# Patient Record
Sex: FEMALE | Race: WHITE
[De-identification: names, ages, dates, MRNs, and addresses within clinical notes are randomized per-mention and may not be internally consistent; named-entity substitution may affect disease eponyms.]

---

## 2019-06-13 NOTE — EDM.PDOC
<Rayne Naylor - Last Filed: 06/13/19 06:47>





ED HPI GENERAL MEDICAL PROBLEM





- General


Chief Complaint: Abdominal Pain


Stated Complaint: ABDOMINAL PAIN


Time Seen by Provider: 06/13/19 04:38





- History of Present Illness


INITIAL COMMENTS - FREE TEXT/NARRATIVE: 





HISTORY AND PHYSICAL:





History of present illness:


The patient is a 23-year-old female with no GI or gynecologic history and no 

abdominal surgical procedures who presents with gradual onset of left lower 

quadrant pain that started at 3 AM, a proximally 2 hours ago. She says the pain 

is like a fullness that is not sharp and she has not taken anything for the 

pain. She's had no nausea or vomiting no fevers chills no flank pain or urinary 

complaints. The patient did have sexual intercourse earlier this evening but 

there was no pain with that and the pain started multiple hours after that. She 

has no history of vaginal bleeding and denies pregnancy as she is on constant 

oral contraception continuously throughout the month. She does say that 

sometimes she will forget to take a dose. The patient did not take any 

medications for this pain prior to coming here and describes it more as an 

uncomfortable feeling and she says she keeps feeling the urge that she has to 

have a bowel movement but nothing is coming out. She leaves that she had a 

bowel movement yesterday but she does not recall and she normally does have 

bowel movements every day. She does drink fluids and does not drink much 

caffeine but she eats mostly cereal and does not eat a lot of vegetables and 

fruits. He does not feel bloated or gassy.





Review of systems: 


As per history of present illness and below otherwise all systems reviewed and 

negative.





Past medical history: 


As per history of present illness and as reviewed below otherwise 

noncontributory.





Surgical history: 


As per history of present illness and as reviewed below otherwise 

noncontributory.





Social history: 


No reported history of drug or alcohol abuse.





Family history: 


As per history of present illness and as reviewed below otherwise 

noncontributory.





Physical exam:


General: Well-developed well-nourished female who is nontoxic and moves very 

easily in the ED without distress. Vital signs are noted by me


HEENT: Atraumatic, normocephalic, , negative for conjunctival pallor or scleral 

icterus, mucous membranes moist, throat clear, neck supple, nontender, trachea 

midline.


Lungs: Clear to auscultation, breath sounds equal bilaterally, chest nontender.


Heart: S1S2, regular rate and rhythm no overt murmurs


Abdomen: Soft, nondistended, there is some tympany on the right lower abdominal 

area without pain or guarding and there is some very minimal tenderness in the 

left lower quadrant on deep palpation which the patient describes as more of a 

discomfort than as a pain. There is no rebound or guarding appreciated and 

overall the exam is very unimpressive. When his disability palpated in the deep 

left pelvis the patient has no specific tenderness. Negative for masses or 

hepatosplenomegaly. Negative for costovertebral tenderness.


Pelvis: Stable nontender.


Genitourinary: Deferred.


Rectal: Deferred.


Extremities: Atraumatic, negative for cords or calf pain. Neurovascular 

unremarkable.


Neuro: Awake, alert, oriented. Cranial nerves II through XII unremarkable. 

Cerebellum unremarkable. Motor and sensory unremarkable throughout. Exam 

nonfocal.





Diagnostics:


UA with reflex HCG CBC CMP lactic acid abdominal x-rays


CT scan of the abdomen and pelvis


Therapeutics:


IV placement, patient declines pain medication at this time


Patient had one episode of vomiting here and says that she doesn't feel like 

she can make a urine sample so I will give Zofran and a liter of IV fluids





We are currently awaiting the x-ray results and the patient still has not given 

us a urine sample and now he is requesting pain meds. I will give her a dose of 

Toradol





The x-rays are back and it does not reveal significant constipation although 

there is some gas and stool. The patient is now saying that this pressure 

feeling that she is having actually resembles a prior UTI where she felt the 

urge to poop and pee and then could not do so. She says she keeps the bathroom 

and she will only have small drops of urine. She says she doesn't feel like she 

can get a urine sample so I have offered her straight catheter which we will go 

ahead and do as she feels this is important to test the urine. I discussed with 

her CAT scan but she would like to wait to see the tests of the urine prior to 

doing that.





I discussed with patient and mother bedside at all testing results do not 

indicate a reason for the pain and patient seems somewhat distressed by this. 

Her overall clinical picture does not demonstrate acute pain but she says that 

the pain is deep and aching and she would like to explore all options for the 

cause. I've offered her CT scan of the abdomen and pelvis with the caveat that 

this may also not identified a source for her pain. She would like to go ahead 

and proceed to do this and to explore every option. I will order a CT with 

contrast





0650: Case  is endorsed to Dr Lowery to follow up the CT result and disposition 

the patient with that result.





Impression: 


Left lower quadrant pain





Definitive disposition and diagnosis as appropriate pending reevaluation and 

review of above.


  ** LLQ abdomen


Pain Score (Numeric/FACES): 5





- Related Data


 Allergies











Allergy/AdvReac Type Severity Reaction Status Date / Time


 


No Known Allergies Allergy   Verified 06/13/19 04:37











Home Meds: 


 Home Meds





Diclofenac Sodium [Voltaren] 75 mg PO BIDMEALS PRN #20 tab.cr 06/13/19 [Rx]


Escitalopram [Lexapro] 1 tab PO DAILY 06/13/19 [History]


LORazepam 1 tab PO ASDIRECTED PRN 06/13/19 [History]











Past Medical History





- Past Health History


Medical/Surgical History: Denies Medical/Surgical History


Psychiatric History: Reports: Anxiety





- Past Surgical History


Musculoskeletal Surgical History: Reports: Other (See Below)


Other Musculoskeletal Surgeries/Procedures:: finger sx





Social & Family History





- Family History


Family Medical History: Noncontributory





- Tobacco Use


Smoking Status *Q: Never Smoker





- Recreational Drug Use


Recreational Drug Use: No





ED ROS GENERAL





- Review of Systems


Review Of Systems: ROS reveals no pertinent complaints other than HPI.





ED EXAM, GENERAL





- Physical Exam


Exam: See Below (See dictation)





Course





- Vital Signs


Last Recorded V/S: 


 Last Vital Signs











Temp  97.7 F   06/13/19 04:30


 


Pulse  68   06/13/19 07:20


 


Resp  16   06/13/19 07:20


 


BP  123/59 L  06/13/19 07:20


 


Pulse Ox  100   06/13/19 07:20














- Orders/Labs/Meds


Orders: 


 Active Orders 24 hr











 Category Date Time Status


 


 Sodium Chloride 0.9% [Saline Flush] Med  06/13/19 04:50 Active





 10 ml FLUSH ASDIRECTED PRN   


 


 Sodium Chloride 0.9% [Saline Flush] Med  06/13/19 04:50 Active





 2.5 ml FLUSH ASDIRECTED PRN   


 


 Saline Lock Insert [OM.PC] Stat Oth  06/13/19 04:50 Ordered








 Medication Orders





Sodium Chloride (Saline Flush)  10 ml FLUSH ASDIRECTED PRN


   PRN Reason: Keep Vein Open


Sodium Chloride (Saline Flush)  2.5 ml FLUSH ASDIRECTED PRN


   PRN Reason: Keep Vein Open








Labs: 


 Laboratory Tests











  06/13/19 06/13/19 06/13/19 Range/Units





  05:00 05:00 05:00 


 


WBC  9.84    (4.0-11.0)  K/uL


 


RBC  4.78    (4.30-5.90)  M/uL


 


Hgb  13.7    (12.0-16.0)  g/dL


 


Hct  40.2    (36.0-46.0)  %


 


MCV  84.1    (80.0-98.0)  fL


 


MCH  28.7    (27.0-32.0)  pg


 


MCHC  34.1    (31.0-37.0)  g/dL


 


RDW Std Deviation  39.4    (28.0-62.0)  fl


 


RDW Coeff of Harjeet  13    (11.0-15.0)  %


 


Plt Count  255    (150-400)  K/uL


 


MPV  10.20    (7.40-12.00)  fL


 


Neut % (Auto)  43.7 L    (48.0-80.0)  %


 


Lymph % (Auto)  46.3 H    (16.0-40.0)  %


 


Mono % (Auto)  7.8    (0.0-15.0)  %


 


Eos % (Auto)  2.0    (0.0-7.0)  %


 


Baso % (Auto)  0.2    (0.0-1.5)  %


 


Neut # (Auto)  4.3    (1.4-5.7)  K/uL


 


Lymph # (Auto)  4.6 H    (0.6-2.4)  K/uL


 


Mono # (Auto)  0.8    (0.0-0.8)  K/uL


 


Eos # (Auto)  0.2    (0.0-0.7)  K/uL


 


Baso # (Auto)  0.0    (0.0-0.1)  K/uL


 


Nucleated RBC %  0.0    /100WBC


 


Nucleated RBCs #  0    K/uL


 


Lactate   1.1   (0.20-2.00)  mmol/L


 


Sodium    136  (136-145)  mmol/L


 


Potassium    3.4 L  (3.5-5.1)  mmol/L


 


Chloride    103  ()  mmol/L


 


Carbon Dioxide    26.1  (21.0-32.0)  mmol/L


 


BUN    14  (7.0-18.0)  mg/dL


 


Creatinine    0.9  (0.6-1.0)  mg/dL


 


Est Cr Clr Drug Dosing    76.89  mL/min


 


Estimated GFR (MDRD)    > 60.0  ml/min


 


Glucose    115 H  ()  mg/dL


 


Calcium    9.1  (8.5-10.1)  mg/dL


 


Total Bilirubin    0.4  (0.2-1.0)  mg/dL


 


AST    21  (15-37)  IU/L


 


ALT    22  (14-63)  IU/L


 


Alkaline Phosphatase    51  ()  U/L


 


Total Protein    7.3  (6.4-8.2)  g/dL


 


Albumin    3.8  (3.4-5.0)  g/dL


 


Globulin    3.5  (2.6-4.0)  g/dL


 


Albumin/Globulin Ratio    1.1  (0.9-1.6)  


 


HCG, Qual     (NEG)  


 


Urine Color     


 


Urine Appearance     


 


Urine pH     (5.0-8.0)  


 


Ur Specific Gravity     (1.001-1.035)  


 


Urine Protein     (NEGATIVE)  mg/dL


 


Urine Glucose (UA)     (NEGATIVE)  mg/dL


 


Urine Ketones     (NEGATIVE)  mg/dL


 


Urine Occult Blood     (NEGATIVE)  


 


Urine Nitrite     (NEGATIVE)  


 


Urine Bilirubin     (NEGATIVE)  


 


Urine Urobilinogen     (<2.0)  EU/dL


 


Ur Leukocyte Esterase     (NEGATIVE)  


 


Urine RBC     (0-2/HPF)  


 


Urine WBC     (0-5/HPF)  


 


Ur Epithelial Cells     (NONE-FEW)  


 


Urine Bacteria     (NEGATIVE)  


 


Urine Mucus     (NONE-MOD)  


 


Urinalysis Comment     














  06/13/19 06/13/19 Range/Units





  05:00 06:25 


 


WBC    (4.0-11.0)  K/uL


 


RBC    (4.30-5.90)  M/uL


 


Hgb    (12.0-16.0)  g/dL


 


Hct    (36.0-46.0)  %


 


MCV    (80.0-98.0)  fL


 


MCH    (27.0-32.0)  pg


 


MCHC    (31.0-37.0)  g/dL


 


RDW Std Deviation    (28.0-62.0)  fl


 


RDW Coeff of Harjeet    (11.0-15.0)  %


 


Plt Count    (150-400)  K/uL


 


MPV    (7.40-12.00)  fL


 


Neut % (Auto)    (48.0-80.0)  %


 


Lymph % (Auto)    (16.0-40.0)  %


 


Mono % (Auto)    (0.0-15.0)  %


 


Eos % (Auto)    (0.0-7.0)  %


 


Baso % (Auto)    (0.0-1.5)  %


 


Neut # (Auto)    (1.4-5.7)  K/uL


 


Lymph # (Auto)    (0.6-2.4)  K/uL


 


Mono # (Auto)    (0.0-0.8)  K/uL


 


Eos # (Auto)    (0.0-0.7)  K/uL


 


Baso # (Auto)    (0.0-0.1)  K/uL


 


Nucleated RBC %    /100WBC


 


Nucleated RBCs #    K/uL


 


Lactate    (0.20-2.00)  mmol/L


 


Sodium    (136-145)  mmol/L


 


Potassium    (3.5-5.1)  mmol/L


 


Chloride    ()  mmol/L


 


Carbon Dioxide    (21.0-32.0)  mmol/L


 


BUN    (7.0-18.0)  mg/dL


 


Creatinine    (0.6-1.0)  mg/dL


 


Est Cr Clr Drug Dosing    mL/min


 


Estimated GFR (MDRD)    ml/min


 


Glucose    ()  mg/dL


 


Calcium    (8.5-10.1)  mg/dL


 


Total Bilirubin    (0.2-1.0)  mg/dL


 


AST    (15-37)  IU/L


 


ALT    (14-63)  IU/L


 


Alkaline Phosphatase    ()  U/L


 


Total Protein    (6.4-8.2)  g/dL


 


Albumin    (3.4-5.0)  g/dL


 


Globulin    (2.6-4.0)  g/dL


 


Albumin/Globulin Ratio    (0.9-1.6)  


 


HCG, Qual  NEGATIVE   (NEG)  


 


Urine Color   YELLOW  


 


Urine Appearance   CLEAR  


 


Urine pH   5.5  (5.0-8.0)  


 


Ur Specific Gravity   >= 1.030  (1.001-1.035)  


 


Urine Protein   NEGATIVE  (NEGATIVE)  mg/dL


 


Urine Glucose (UA)   NEGATIVE  (NEGATIVE)  mg/dL


 


Urine Ketones   NEGATIVE  (NEGATIVE)  mg/dL


 


Urine Occult Blood   MODERATE H  (NEGATIVE)  


 


Urine Nitrite   NEGATIVE  (NEGATIVE)  


 


Urine Bilirubin   NEGATIVE  (NEGATIVE)  


 


Urine Urobilinogen   0.2  (<2.0)  EU/dL


 


Ur Leukocyte Esterase   NEGATIVE  (NEGATIVE)  


 


Urine RBC   4-6  (0-2/HPF)  


 


Urine WBC   0-2  (0-5/HPF)  


 


Ur Epithelial Cells   MODERATE  (NONE-FEW)  


 


Urine Bacteria   FEW  (NEGATIVE)  


 


Urine Mucus   MODERATE  (NONE-MOD)  


 


Urinalysis Comment     











Meds: 


Medications











Generic Name Dose Route Start Last Admin





  Trade Name Freq  PRN Reason Stop Dose Admin


 


Sodium Chloride  10 ml  06/13/19 04:50  





  Saline Flush  FLUSH   





  ASDIRECTED PRN   





  Keep Vein Open   





     





     





     


 


Sodium Chloride  2.5 ml  06/13/19 04:50  





  Saline Flush  FLUSH   





  ASDIRECTED PRN   





  Keep Vein Open   





     





     





     














Discontinued Medications














Generic Name Dose Route Start Last Admin





  Trade Name Freq  PRN Reason Stop Dose Admin


 


Sodium Chloride  1,000 mls @ 999 mls/hr  06/13/19 05:24  06/13/19 05:29





  Normal Saline  IV  06/13/19 06:24  999 mls/hr





  STAT ONE   Administration





     





     





     





     


 


Sodium Chloride  1,000 mls @ 999 mls/hr  06/13/19 06:31  06/13/19 06:36





  Normal Saline  IV  06/13/19 07:31  999 mls/hr





  STAT ONE   Administration





     





     





     





     


 


Iopamidol  100 ml  06/13/19 07:10  06/13/19 07:10





  Isovue Multipack-370 (76%)  IVPUSH  06/13/19 07:11  100 ml





  ONETIME STA   Administration





     





     





     





     


 


Ketorolac Tromethamine  30 mg  06/13/19 06:04  06/13/19 06:10





  Toradol  IVPUSH  06/13/19 06:05  30 mg





  ONETIME ONE   Administration





     





     





     





     


 


Ondansetron HCl  4 mg  06/13/19 05:24  06/13/19 05:30





  Zofran  IVPUSH  06/13/19 05:25  4 mg





  ONETIME ONE   Administration





     





     





     





     














Departure





- Departure


Disposition: Home, Self-Care 01


Condition: Good


Clinical Impression: 


Abdominal pain


Qualifiers:


 Abdominal location: left lower quadrant Qualified Code(s): R10.32 - Left lower 

quadrant pain








- Discharge Information


Prescriptions: 


Diclofenac Sodium [Voltaren] 75 mg PO BIDMEALS PRN #20 tab.cr


 PRN Reason: Pain


Referrals: 


Briana Coreas DO [Primary Care Provider] - 


Forms:  ED Department Discharge


Additional Instructions: 


The following information is given to patients seen in the emergency department 

who are being discharged to home. This information is to outline your options 

for follow-up care. We provide all patients seen in our emergency department 

with a follow-up referral.





The need for follow-up, as well as the timing and circumstances, are variable 

depending upon the specifics of your emergency department visit.





If you don't have a primary care physician on staff, we will provide you with a 

referral. We always advise you to contact your personal physician following an 

emergency department visit to inform them of the circumstance of the visit and 

for follow-up with them and/or the need for any referrals to a consulting 

specialist.





The emergency department will also refer you to a specialist when appropriate. 

This referral assures that you have the opportunity for followup care with a 

specialist. All of these measure are taken in an effort to provide you with 

optimal care, which includes your followup.





Under all circumstances we always encourage you to contact your private 

physician who remains a resource for coordinating  your care. When calling for 

followup care, please make the office aware that this follow-up is from your 

recent emergency room visit. If for any reason you are refused follow-up, 

please contact the [facility] emergency 

department at [phone] and ask to speak to the emergency department 

charge nurse.





[facility] 


Primary care- Internal Medicine and Family Prctice


[address] [place] [address]


[phone]








[facility]


Specialty Care - Urology


[address] [place] [id number]


Phone: [phone]


Fax: [phone]





<Shannan Lowery - Last Filed: 06/13/19 08:11>





ED HPI GENERAL MEDICAL PROBLEM





- History of Present Illness


INITIAL COMMENTS - FREE TEXT/NARRATIVE: 


Patient was signed out to me by Dr. Naylor to check results of CT scan. 

Patient has left flank and lower quadrant pain with few red blood cells in her 

urine. Patient CT showed a 4 mm stone at the left UVJ is causing mild 

hydronephrosis. Remainder of the exam is normal. She is being discharged with 

instructions to drink plenty of fluids











Departure





- Departure


Time of Disposition: 08:11





- Discharge Information


*PRESCRIPTION DRUG MONITORING PROGRAM REVIEWED*: No


*COPY OF PRESCRIPTION DRUG MONITORING REPORT IN PATIENT ROHAN: No

## 2019-06-13 NOTE — CR
Indication:



Left lower quadrant abdomen pain.



Technique:



Abdomen 3 view.



Comparison:



None. 



Findings:



Bowel: Bowel pattern is normal. There is little to no colonic stool.



Other: No sign of free air.  No sign of soft tissue mass.  No suspicious 

calcifications. Osseous structures are unremarkable for age.  



Impression:



Unremarkable abdomen. No sign of constipation.



Dictated by Booker Rincon MD @ Jun 13 2019  6:15AM



Signed by Dr. Booker Rincon @ Jun 13 2019  6:15AM

## 2019-06-13 NOTE — CT
INDICATION:



Left lower quadrant abdomen pain.



TECHNIQUE:



CT abdomen and pelvis acquired with 100 cc Isovue 370 IV contrast.



COMPARISON:



None. 



FINDINGS:



Lower chest: Unremarkable. 



Liver: Unremarkable. Normal in size and attenuation. No masses. 



Gallbladder and bile ducts: Unremarkable. No stones or inflammation. No 

biliary dilatation. 



Pancreas: Unremarkable. No mass or inflammation. 



Spleen: Unremarkable. Normal in size. No masses. 



Adrenal glands: Unremarkable. No nodules. 



Kidneys: A 4 mm stone at the left ureterovesical junction is causing mild 

hydronephrosis and perinephric edema. No other stones. 



GI tract: Unremarkable. Normal in caliber. No sign of mass or inflammation. 

Normal appendix.



Vasculature: Unremarkable. Mesenteric arteries are patent.



Lymph nodes: No lymphadenopathy. 



Omentum/Peritoneum/Abdominal Wall: Unremarkable. No sign of mass or 

infiltration. No free air or significant free fluid. 



Pelvis: Unremarkable. 



Bones: Unremarkable for age. 



IMPRESSION:



4 mm stone at the left UVJ is causing mild hydronephrosis. Remainder of the 

exam is normal.



Please note that all CT scans at this facility use dose modulation, 

iterative reconstruction, and/or weight-based dosing when appropriate to 

reduce radiation dose to as low as reasonably achievable.



Dictated by Booker Rincon MD @ Jun 13 2019  7:58AM



Signed by Dr. Booker Rincon @ Jun 13 2019  8:02AM

## 2021-01-03 ENCOUNTER — HOSPITAL ENCOUNTER (OUTPATIENT)
Dept: HOSPITAL 56 - MW.OBCHECK | Age: 26
Setting detail: OBSERVATION
LOS: 3 days | Discharge: HOME | End: 2021-01-06
Attending: OBSTETRICS & GYNECOLOGY | Admitting: OBSTETRICS & GYNECOLOGY
Payer: COMMERCIAL

## 2021-01-03 DIAGNOSIS — F41.9: ICD-10-CM

## 2021-01-03 DIAGNOSIS — Z91.040: ICD-10-CM

## 2021-01-03 DIAGNOSIS — Z3A.40: ICD-10-CM

## 2021-01-03 PROCEDURE — 82803 BLOOD GASES ANY COMBINATION: CPT

## 2021-01-03 PROCEDURE — 36415 COLL VENOUS BLD VENIPUNCTURE: CPT

## 2021-01-03 PROCEDURE — 85027 COMPLETE CBC AUTOMATED: CPT

## 2021-01-03 PROCEDURE — 85014 HEMATOCRIT: CPT

## 2021-01-03 PROCEDURE — 59025 FETAL NON-STRESS TEST: CPT

## 2021-01-03 PROCEDURE — 51702 INSERT TEMP BLADDER CATH: CPT

## 2021-01-03 PROCEDURE — 59409 OBSTETRICAL CARE: CPT

## 2021-01-03 PROCEDURE — 88307 TISSUE EXAM BY PATHOLOGIST: CPT

## 2021-01-03 PROCEDURE — 85018 HEMOGLOBIN: CPT

## 2021-01-03 PROCEDURE — 86850 RBC ANTIBODY SCREEN: CPT

## 2021-01-03 PROCEDURE — 86901 BLOOD TYPING SEROLOGIC RH(D): CPT

## 2021-01-03 PROCEDURE — 86592 SYPHILIS TEST NON-TREP QUAL: CPT

## 2021-01-03 PROCEDURE — 86900 BLOOD TYPING SEROLOGIC ABO: CPT

## 2021-01-04 RX ADMIN — WITCH HAZEL PRN CONTAINER: 500 SOLUTION RECTAL; TOPICAL at 22:37

## 2021-01-04 NOTE — OR
SURGEON:

Elma Yoon M.D.

 

DATE OF PROCEDURE:  2021

 

PREOPERATIVE DIAGNOSES:

1. 40 and 3-week term pregnancy.

2. Active labor.

3. Meconium-stained amniotic fluid.

 

POSTOPERATIVE DIAGNOSES:

1. 40 and 3-week term pregnancy.

2. Active labor.

3. Meconium-stained amniotic fluid.

 

PROCEDURE:

Spontaneous vaginal delivery, second-degree midline laceration repaired.

 

PRIMARY SURGEON:

Elma Yoon M.D.

 

ANESTHESIA:

Epidural.

 

ESTIMATED BLOOD LOSS:

300 mL.

 

 

COMPLICATIONS:

None known.

 

FINDINGS:

Viable male, Apgar scores 2 at one minute, 6 at fives minutes, 9 at ten minutes.

Spontaneous delivery, intact placenta, 3-vessel cord.  Meconium-stained amniotic

fluid and placenta noted.

 

DISPOSITION:

Infant to  nursery.  Mom in LDRP.

 

PROCEDURE DETAILS:

Pat is a 25-year-old G2, P 0-0-1-0 at 40 and 3 weeks' gestational age, who

presented with a protracted early labor pattern, and after observation was

making some slow cervical change, therefore, admitted to Labor and Delivery.

The patient was monitored and made slow cervical change.  Therefore, amniotomy

was performed.  Meconium-stained amniotic fluid was noted.  This was shortly

before 7 a.m.  The patient was increasingly uncomfortable, however, contractions

were still fairly mild.  Therefore, initiated on Pitocin augmentation.  The

patient underwent epidural and became more comfortable.  Shortly before 10 a.m.,

she was still found to be 4 to 5 cm, 90% effaced, +1 station.  Fetal heart tones

were 120s variability.  The patient continued to progress and during the

afternoon progressed a bit more quickly.  Shortly before 2 p.m., she was found

to be 8 cm.  Within the next a 2-1/2 hours, she progressed to complete, 100%

effaced, +1 station.  Began pushing efforts shortly after 4 p.m.  She pushed for

the next hour with minimal change in station.  Therefore, I evaluated and found

that the fetus was felt to be ROP presentation.  Therefore, after palpating

sagittal sutures was able to rotate to an OA position; and with continued

pushing efforts, the patient began to progress much more nicely thereafter.

Within the next hour, she was able to progress and push to a +4 station.  The

fetal heart tones at this point were now 130s to 140s with variability.

Occasional deceleration with pushing efforts, however, with fairly rapid return

to baseline.  The patient was able to deliver the head atraumatically

spontaneously, followed by anterior shoulder, posterior shoulder, and remainder

of the body.  The infant's oropharynx and nares were bulb suctioned.  The infant

was handed off to his mother with attending nursing staff at her side.  The

infant was not making attempts to cry vigorously at this juncture.  Therefore,

given the meconium staining, the cord was clamped x2 and cut.  The infant was

transferred to the warming isolette.

 

Cord arterial, cord venous, and cord blood sampling were obtained.  Light

pressure was applied while the placenta was delivered spontaneously intact.

Vigorous fundal uterine massage was then applied while 30 units of Pitocin was

delivered in 500 mL of fluid.  Upon inspection of cervix, vaginal sidewall, and

perineum, there was found to be a deeper second-degree midline laceration,

repaired using 3-0 Vicryl in the usual fashion.  Uterus remained firm.  Upon

inspection of cervix, vaginal sidewalls, and perineum, hemostasis was evident.

Sponge, instrument, and needle count was correct.  The patient will remain in

LDRP.  The infant is being transferred to  nursery for observation.

 

 

[place] / BETZY

DD:  2021 19:10:01

DT:  2021 20:26:19

Job #:  [id number]

MTDD

## 2021-01-04 NOTE — PCM.OPNOTE
- General Post-Op/Procedure Note


Date of Surgery/Procedure: 21


Operative Procedure(s): /2nd MLL repaired


Findings: 





Viable male APGARs 2, 6, 9 weight 3210 gm. Spontaneous delivery intact placenta 

with 3V cord. Meconium stained fluid


Pre Op Diagnosis: 40/3 week IUP.  Labor.  Meconium stained fluid


Post-Op Diagnosis: Same


Anesthesia Technique: Epidural


Primary Surgeon: Elma Yoon


EBL in mLs: 300


Complications: none known


Condition: Good


Free Text/Narrative:: 


                                 Intake & Output











 21





 06:59 14:59 22:59


 


Intake Total  3000 900


 


Balance  3000 900








Dictation [id number]

## 2021-01-04 NOTE — PCM.SN.2
- Free Text/Narrative


Note: 





Increased rate to 10ml/hr. Bolus total 4ml 0.2 % Naropin with 100 mcg Fentanyl. 

Demand bolus off.  Complete.

## 2021-01-04 NOTE — PCM.PRNOTE
- Free Text/Narrative


Note: 





Anes Note





Patient requests epidural for L&D. Sitting position, Level L3-L4 midline 

approach. Sterile techniuqe. Chloraprep scrub to lumbar area. Steril fenestrated

drape applied.





Epidural space easily achieved single attempt with ease using LUCILLE technique. LUCILLE

at 3 cm. Cath threaded 5 cm with ease. Cath secured at skin using steril clear 

adhesive dressing. 





Test 0645 3 cc 1.5% lido with epi negative. 





Load 0648 10 cc 0.2% ropivicaine with 1 mcg cc fentanyl in slow divided doses. 





0655 Pump started wtih 90 cc same solution. Rate is 8 cc hr with 6 cc q 20 min 

prn bolus. 





Yana well.





Time with patient [id number]

## 2021-01-04 NOTE — PCM.SN.2
- Free Text/Narrative


Note: 





Bolus not functioning.  Difficult to flush.  Pulled out 1/8 inch, flushed well. 

Bolus given 4ml 0.2% naropin + 100 mcg fentanyl.  Pain resolved.  Pump 

functioning well.  1215





!6:05 Bolus not functioning.  Bolused manually with ml 0.2% Naropin.  Pump 

functioning well.

## 2021-01-04 NOTE — PCM.PREANE
Preanesthetic Assessment





- Anesthesia/Transfusion/Family Hx


Anesthesia History: Prior Anesthesia Without Reaction


Family History of Anesthesia Reaction: No


Transfusion History: No Prior Transfusion(s)





- Physical Assessment


NPO Status Date: 01/04/21


NPO Status Time: 00:05


Height: 1.6 m


Weight: 81.647 kg


ASA Class: 2





- Lab


Values: 





                             Laboratory Last Values











WBC  20.46 K/uL (4.0-11.0)  H  01/03/21  23:59    


 


RBC  4.56 M/uL (4.30-5.90)   01/03/21  23:59    


 


Hgb  13.5 g/dL (12.0-16.0)   01/03/21  23:59    


 


Hct  39.0 % (36.0-46.0)   01/03/21  23:59    


 


MCV  85.5 fL (80.0-98.0)   01/03/21  23:59    


 


MCH  29.6 pg (27.0-32.0)   01/03/21  23:59    


 


MCHC  34.6 g/dL (31.0-37.0)   01/03/21  23:59    


 


RDW Std Deviation  42.3 fl (28.0-62.0)   01/03/21  23:59    


 


RDW Coeff of Harjeet  14 % (11.0-15.0)   01/03/21  23:59    


 


Plt Count  204 K/uL (150-400)   01/03/21  23:59    


 


MPV  11.10 fL (7.40-12.00)   01/03/21  23:59    


 


Blood Type  O POSITIVE   01/03/21  23:59    


 


Antibody Screen  NEGATIVE   01/03/21  23:59    














- Allergies


Allergies/Adverse Reactions: 


                                    Allergies











Allergy/AdvReac Type Severity Reaction Status Date / Time


 


Latex, Natural Rubber Allergy  Rash Verified 01/03/21 17:00














- Acknowledgements


Anesthesia Type Planned: Epidural


Pt an Appropriate Candidate for the Planned Anesthesia: Yes


Alternatives and Risks of Anesthesia Discussed w Pt/Guardian: Yes


Pt/Guardian Understands and Agrees with Anesthesia Plan: Yes





PreAnesthesia Questionnaire





- Past Health History


Medical/Surgical History: Denies Medical/Surgical History


HEENT History: Reports: Impaired Vision


Cardiovascular History: Reports: None


Respiratory History: Reports: None


Gastrointestinal History: Reports: None


Genitourinary History: Reports: Renal Calculus


OB/GYN History: Reports: Pregnancy


Neurological History: Reports: None


Psychiatric History: Reports: Anxiety, Panic Attack


Endocrine/Metabolic History: Reports: None


Hematologic History: Reports: None


Immunologic History: Reports: None


Oncologic (Cancer) History: Reports: None


Dermatologic History: Reports: None





- Infectious Disease History


Infectious Disease History: Reports: None





- Past Surgical History


GI Surgical History: Reports: None


Female  Surgical History: Reports: None


Musculoskeletal Surgical History: Reports: Other (See Below)


Other Musculoskeletal Surgeries/Procedures:: left 4th finger surgery





- SUBSTANCE USE


Tobacco Use Status *Q: Never Tobacco User


Second Hand Smoke Exposure: No


Recreational Drug Use History: No





- HOME MEDS


Home Medications: 


                                    Home Meds





Prenatal Vits #93/Iron Fum/FA [Prenatal Formula Tablet] 1 each PO BEDTIME 

12/31/20 [History]


Escitalopram [Lexapro] 15 mg PO BEDTIME 01/03/21 [History]


hydrOXYzine HCL [hydrOXYzine] 1 tab PO Q4H PRN 01/03/21 [History]











- CURRENT (IN HOUSE) MEDS


Current Meds: 





                               Current Medications





Butorphanol Tartrate (Stadol)  1 mg IVPUSH Q1H PRN


   PRN Reason: Pain


Carboprost Tromethamine (Hemabate Ds)  250 mcg IM ASDIRECTED PRN


   PRN Reason: Post Partum Hemorrhage


Oxytocin/Sodium Chloride (Oxytocin 30 Unit/500 Ml-Ns)  30 unit in 500 mls @ 500 

mls/hr IV TITRATE [facility]


Tranexamic Acid 1,000 mg/ (Sodium Chloride)  110 mls @ 660 mls/hr IV ONETIME PRN


   PRN Reason: Bleeding


Lactated Ringer's (Ringers, Lactated)  1,000 mls @ 150 mls/hr IV ASDIRECTED [facility]


Lidocaine HCl (Xylocaine 1%)  50 ml INJECT ONETIME PRN


   PRN Reason: Laceration repair


Methylergonovine Maleate (Methergine)  0.2 mg IM ASDIRECTED PRN


   PRN Reason: Post Partum Hemorrhage


Misoprostol (Cytotec)  200 mcg PO ONETIME PRN


   PRN Reason: Post Partum Hemorrhage


Nalbuphine HCl (Nubain)  10 mg IVPUSH Q1H PRN


   PRN Reason: Pain (severe 7-10)


Sodium Chloride (Saline Flush)  10 ml FLUSH ASDIRECTED PRN


   PRN Reason: Keep Vein Open


Sodium Chloride (Saline Flush)  2.5 ml FLUSH ASDIRECTED PRN


   PRN Reason: Keep Vein Open


Sodium Chloride (Normal Saline)  10 ml IV ASDIRECTED PRN


   PRN Reason: IV Use


Sterile Water (Sterile Water For Irrigation)  1,000 ml IRR ASDIRECTED PRN


   PRN Reason: delivery





Discontinued Medications





Fentanyl (Sublimaze) Confirm Administered Dose 100 mcg .ROUTE [facility]


   Stop: 01/04/21 06:33


Ropivacaine (Naropin 0.2%) Confirm Administered Dose 100 mls @ as directed 

.ROUTE [facility]


   Stop: 01/04/21 06:33

## 2021-01-05 RX ADMIN — WITCH HAZEL PRN CONTAINER: 500 SOLUTION RECTAL; TOPICAL at 21:53

## 2021-01-05 NOTE — PCM.PNPP
- General Info


Date of Service: 21


Functional Status: Reports: Pain Controlled, Tolerating Diet, Ambulating, 

Urinating





- Review of Systems


General: Reports: Fatigue.  Denies: Fever, Weakness


Pulmonary: Denies: Shortness of Breath


Cardiovascular: Denies: Chest Pain, Palpitations, Lightheadedness


Gastrointestinal: Denies: Abdominal Pain, Nausea, Vomiting


Genitourinary: Denies: Flank Pain


Skin: Reports: No Symptoms


Neurological: Reports: No Symptoms


Psychiatric: Reports: No Symptoms





- General Info


Date of Service: 21





- Patient Data


Vital Signs - Most Recent: 


                                Last Vital Signs











Temp  36.5 C   21 03:30


 


Pulse  90   21 03:30


 


Resp  16   21 03:30


 


BP  101/56 L  21 03:30


 


Pulse Ox  97   21 03:30











Weight - Most Recent: 81.647 kg


I&O - Last 24 Hours: 


                                 Intake & Output











 21





 22:59 06:59 14:59


 


Intake Total 900  


 


Balance 900  











Lab Results - Last 24 Hours: 


                         Laboratory Results - last 24 hr











  21 Range/Units





  18:35 06:15 


 


Hgb   11.3 L  (12.0-16.0)  g/dL


 


Hct   34.3 L  (36.0-46.0)  %


 


Cord ABG pH  7.017 L   (7.18-7.38)  


 


Cord ABG Base Excess  -13 L   (-10--2)  


 


Cord VBG pH  7.109 L   (7.25-7.45)  


 


Cord VBG Base Excess  -13 L   (-10--2)  











Med Orders - Current: 


                               Current Medications





Acetaminophen (Tylenol Extra Strength)  500 mg PO Q4H PRN


   PRN Reason: Pain


Acetaminophen (Tylenol Extra Strength)  1,000 mg PO Q4H PRN


   PRN Reason: Pain


   Last Admin: 21 00:56 Dose:  1,000 mg


   Documented by: 


Benzocaine/Menthol (Dermoplast Pain Relief 20%-0.5% Spray)  78 gm TOP ASDIRECTED

PRN


   PRN Reason: Perineal Comfort Measure


   Last Admin: 21 22:37 Dose:  1 container


   Documented by: 


Bisacodyl (Dulcolax)  10 mg RECTAL ONETIME PRN


   PRN Reason: Constipation


Carboprost Tromethamine (Hemabate Ds)  250 mcg IM ASDIRECTED PRN


   PRN Reason: Post Partum Hemorrhage


Docusate Sodium (Colace)  100 mg PO BID PRN


   PRN Reason: Constipation


   Last Admin: 21 22:35 Dose:  100 mg


   Documented by: 


Emollient Ointment (Lansinoh Hpa)  0 gm TOP ASDIRECTED PRN


   PRN Reason: Sore Nipples


Escitalopram Oxalate (Lexapro)  10 mg PO DAILY [facility]


Hydrocortisone (Proctozone-Hc 2.5% Crm)  1 gm TOP CONTINUOUS PRN


   PRN Reason: Itching


Oxytocin/Sodium Chloride (Oxytocin 30 Unit/500 Ml-Ns)  30 unit in 500 mls @ 500 

mls/hr IV TITRATE [facility]


Tranexamic Acid 1,000 mg/ (Sodium Chloride)  110 mls @ 660 mls/hr IV ONETIME PRN


   PRN Reason: Bleeding


Lactated Ringer's (Ringers, Lactated)  1,000 mls @ 150 mls/hr IV ASDIRECTED [facility]


   Last Admin: 21 14:45 Dose:  250 mls/hr


   Documented by: 


Oxytocin/Sodium Chloride (Oxytocin 30 Unit/500 Ml-Ns)  30 unit in 500 mls @ 2 

mls/hr IV TITRATE [facility]; Protocol


   Last Titration: 21 19:08 Dose:  250 munits/min, 250 mls/hr


   Documented by: 


Ibuprofen (Motrin)  400 mg PO Q4H PRN


   PRN Reason: Pain


Ibuprofen (Motrin)  800 mg PO Q6H PRN


   PRN Reason: Pain


   Last Admin: 21 04:12 Dose:  800 mg


   Documented by: 


Lidocaine HCl (Xylocaine 1%)  50 ml INJECT ONETIME PRN


   PRN Reason: Laceration repair


Methylergonovine Maleate (Methergine)  0.2 mg IM ASDIRECTED PRN


   PRN Reason: Post Partum Hemorrhage


Oxycodone HCl (Oxycodone)  5 mg PO Q2H PRN


   PRN Reason: Pain


Sodium Chloride (Saline Flush)  10 ml FLUSH ASDIRECTED PRN


   PRN Reason: Keep Vein Open


Sodium Chloride (Saline Flush)  2.5 ml FLUSH ASDIRECTED PRN


   PRN Reason: Keep Vein Open


Sodium Chloride (Normal Saline)  10 ml IV ASDIRECTED PRN


   PRN Reason: IV Use


Sterile Water (Sterile Water For Irrigation)  1,000 ml IRR ASDIRECTED PRN


   PRN Reason: delivery


Witch Hazel (Tucks)  1 pad TOP ASDIRECTED PRN


   PRN Reason: comfort care


   Last Admin: 21 22:37 Dose:  1 container


   Documented by: 





Discontinued Medications





Butorphanol Tartrate (Stadol)  1 mg IVPUSH Q1H PRN


   PRN Reason: Pain


Fentanyl (Sublimaze) Confirm Administered Dose 100 mcg .ROUTE .[facility]-MED ONE


   Stop: 21 06:33


Fentanyl (Sublimaze) Confirm Administered Dose 100 mcg .ROUTE .[facility]-MED ONE


   Stop: 21 11:32


Fentanyl (Sublimaze) Confirm Administered Dose 100 mcg .ROUTE .[facility]-MED ONE


   Stop: 21 16:16


Ropivacaine (Naropin 0.2%) Confirm Administered Dose 100 mls @ as directed 

.ROUTE .[facility]-MED ONE


   Stop: 21 06:33


Misoprostol (Cytotec)  200 mcg PO ONETIME PRN


   PRN Reason: Post Partum Hemorrhage


Misoprostol (Cytotec)  25 mcg VAG ONETIME PRN


   PRN Reason: Cervical Ripening


Misoprostol (Cytotec)  25 mcg VAG Q4H PRN


   PRN Reason: Cervical Ripening


Nalbuphine HCl (Nubain)  10 mg IVPUSH Q1H PRN


   PRN Reason: Pain (severe 7-10)


Ropivacaine (Naropin 0.2%) Confirm Administered Dose 20 ml .ROUTE .ST[facility]-MED ONE


   Stop: 21 11:33


Ropivacaine (Naropin 0.2%) Confirm Administered Dose 20 ml .ROUTE .[facility]-MED ONE


   Stop: 21 16:16


Terbutaline Sulfate (Brethine)  0.25 mg SUBCUT ASDIRECTED PRN


   PRN Reason: Tacysystole











- Infant Interaction


Support Person: 





- Postpartum Recovery Exam


Fundal Tone: Firm


Fundal Level: At Umbilicus


Fundal Placement: Midline


Lochia Amount: Small


Lochia Color: Rubra/Red


Bladder Status: Voiding





- Exam


General: Alert, Oriented


Lungs: Normal Respiratory Effort


Cardiovascular: Regular Rate, Regular Rhythm


GI/Abdominal Exam: Normal Bowel Sounds, Soft


Extremities: Pedal Edema (1+).  No: Gamal's Sign


Skin: Warm, Dry, Intact


Neurological: No New Focal Deficit


Psy/Mental Status: Alert, Normal Affect, Normal Mood





- Problem List & Annotations


(1) Vaginal delivery


SNOMED Code(s): 808405314


   Code(s): O80 - ENCOUNTER FOR FULL-TERM UNCOMPLICATED DELIVERY   Status: Acute

  Current Visit: Yes   





- Problem List Review


Problem List Initiated/Reviewed/Updated: Yes





- My Orders


Last 24 Hours: 


My Active Orders





21 Dinner


Regular Diet [DIET] 





21 18:56


Patient Status [ADT] Routine 


May Shower [RC] ASDIRECTED 


Notify Provider Vital Signs [RC] ASDIRECTED 


Up ad Beth [RC] ASDIRECTED 


Vital Signs [RC] PER UNIT ROUTINE 


Acetaminophen [Tylenol Extra Strength]   1,000 mg PO Q4H PRN 


Acetaminophen [Tylenol Extra Strength]   500 mg PO Q4H PRN 


Benzocaine/Menthol [Dermoplast Pain Relief 20%-0.5% Spray]   78 gm TOP 

ASDIRECTED PRN 


Docusate Sodium [Colace]   100 mg PO BID PRN 


Hydrocortisone [Proctozone-HC 2.5% Crm]   1 gm TOP CONTINUOUS PRN 


Ibuprofen [Motrin]   400 mg PO Q4H PRN 


Ibuprofen [Motrin]   800 mg PO Q6H PRN 


Lanolin [Lansinoh HPA]   See Dose Instructions  TOP ASDIRECTED PRN 


bisacodyL [Dulcolax]   10 mg RECTAL ONETIME PRN 


oxyCODONE   5 mg PO Q2H PRN 


witch hazeL [Tucks]   1 pad TOP ASDIRECTED PRN 


Assess Lochia [WOMSER] Per Unit Routine 


Assess Uterine Involution [WOMSER] Per Unit Routine 


Peripheral IV Discontinue [OM.PC] Routine 





21 18:57


Ice Therapy [OM.PC] Per Unit Routine 


Perineal Care [OM.PC] Per Unit Routine 


Sitz Bath [OM.PC] Per Unit Routine 





21 09:00


Escitalopram [Lexapro]   10 mg PO DAILY 














- Assessment


Assessment:: 





PPD 1 status post 





- Plan


Plan:: 


Continue postpartum cares--work with breastfeeding today. VS are stable and labs

 reassuring. Plan discharge in am if does well today.

## 2021-01-05 NOTE — [id number]
Post Anesthesia Note





- EVALUATION WITHIN 48HRS OF ANESTHETIC


Vital Signs in Normal Range: Yes


Patient Participated in Evaluation: Yes


Respiratory Function Stable: Yes


Airway Patent: Yes


Cardiovascular Function Stable: Yes


Hydration Status Stable: Yes


Pain Control Satisfactory: Yes


Nausea and Vomiting Control Satisfactory: Yes


Mental Status Recovered: Yes


Vital Signs: 


                                Last Vital Signs











Temp  36.5 C   01/05/21 03:30


 


Pulse  90   01/05/21 03:30


 


Resp  16   01/05/21 03:30


 


BP  101/56 L  01/05/21 03:30


 


Pulse Ox  97   01/05/21 03:30














- COMMENTS/OBSERVATIONS


Free Text/Narrative:: 


The patient has no complaints at this time.  There were no apparent anesthetic 

complications at this time.  Discharge from Anesthesia Service.

## 2021-01-06 NOTE — PCM.PNPP
- General Info


Date of Service: 21


Functional Status: Reports: Pain Controlled, Tolerating Diet, Ambulating, 

Urinating





- Review of Systems


General: Reports: Fatigue.  Denies: Fever, Weakness


Pulmonary: Denies: Shortness of Breath


Cardiovascular: Denies: Chest Pain, Palpitations, Lightheadedness


Gastrointestinal: Denies: Abdominal Pain, Nausea, Vomiting


Genitourinary: Denies: Flank Pain


Musculoskeletal: Reports: No Symptoms


Skin: Reports: No Symptoms


Neurological: Reports: No Symptoms


Psychiatric: Reports: No Symptoms





- General Info


Date of Service: 21





- Patient Data


Vital Signs - Most Recent: 


                                Last Vital Signs











Temp  36.8 C   21 20:15


 


Pulse  89   21 20:15


 


Resp  16   21 20:15


 


BP  123/81   21 20:15


 


Pulse Ox  94 L  21 20:15











Weight - Most Recent: 81.647 kg


Med Orders - Current: 


                               Current Medications





Acetaminophen (Tylenol Extra Strength)  500 mg PO Q4H PRN


   PRN Reason: Pain


   Last Admin: 21 14:19 Dose:  500 mg


   Documented by: 


Acetaminophen (Tylenol Extra Strength)  1,000 mg PO Q4H PRN


   PRN Reason: Pain


   Last Admin: 21 04:20 Dose:  1,000 mg


   Documented by: 


Benzocaine/Menthol (Dermoplast Pain Relief 20%-0.5% Spray)  78 gm TOP ASDIRECTED

PRN


   PRN Reason: Perineal Comfort Measure


   Last Admin: 21 22:37 Dose:  1 container


   Documented by: 


Bisacodyl (Dulcolax)  10 mg RECTAL ONETIME PRN


   PRN Reason: Constipation


Docusate Sodium (Colace)  100 mg PO BID PRN


   PRN Reason: Constipation


   Last Admin: 21 21:53 Dose:  100 mg


   Documented by: 


Emollient Ointment (Lansinoh Hpa)  0 gm TOP ASDIRECTED PRN


   PRN Reason: Sore Nipples


Escitalopram Oxalate (Lexapro)  10 mg PO DAILY [facility]


   Last Admin: 21 10:28 Dose:  10 mg


   Documented by: 


Hydrocortisone (Proctozone-Hc 2.5% Crm)  1 gm TOP CONTINUOUS PRN


   PRN Reason: Itching


Oxytocin/Sodium Chloride (Oxytocin 30 Unit/500 Ml-Ns)  30 unit in 500 mls @ 2 

mls/hr IV TITRATE [facility]; Protocol


   Last Titration: 21 19:08 Dose:  250 munits/min, 250 mls/hr


   Documented by: 


Ibuprofen (Motrin)  400 mg PO Q4H PRN


   PRN Reason: Pain


Ibuprofen (Motrin)  800 mg PO Q6H PRN


   PRN Reason: Pain


   Last Admin: 21 23:48 Dose:  800 mg


   Documented by: 


Oxycodone HCl (Oxycodone)  5 mg PO Q2H PRN


   PRN Reason: Pain


Sodium Chloride (Saline Flush)  10 ml FLUSH ASDIRECTED PRN


   PRN Reason: Keep Vein Open


Sodium Chloride (Saline Flush)  2.5 ml FLUSH ASDIRECTED PRN


   PRN Reason: Keep Vein Open


Sodium Chloride (Normal Saline)  10 ml IV ASDIRECTED PRN


   PRN Reason: IV Use


Witch Hazel (Tucks)  1 pad TOP ASDIRECTED PRN


   PRN Reason: comfort care


   Last Admin: 21 21:53 Dose:  1 container


   Documented by: 





Discontinued Medications





Butorphanol Tartrate (Stadol)  1 mg IVPUSH Q1H PRN


   PRN Reason: Pain


Carboprost Tromethamine (Hemabate Ds)  250 mcg IM ASDIRECTED PRN


   PRN Reason: Post Partum Hemorrhage


Fentanyl (Sublimaze) Confirm Administered Dose 100 mcg .ROUTE .[facility]-MED ONE


   Stop: 21 06:33


   Last Admin: 21 19:45 Dose:  Not Given


   Documented by: 


Fentanyl (Sublimaze) Confirm Administered Dose 100 mcg .ROUTE .ST[facility]-MED ONE


   Stop: 21 11:32


   Last Admin: 21 19:45 Dose:  Not Given


   Documented by: 


Fentanyl (Sublimaze) Confirm Administered Dose 100 mcg .ROUTE .[facility]-MED ONE


   Stop: 21 16:16


   Last Admin: 21 19:46 Dose:  Not Given


   Documented by: 


Oxytocin/Sodium Chloride (Oxytocin 30 Unit/500 Ml-Ns)  30 unit in 500 mls @ 500 

mls/hr IV TITRATE [facility]


Tranexamic Acid 1,000 mg/ (Sodium Chloride)  110 mls @ 660 mls/hr IV ONETIME PRN


   PRN Reason: Bleeding


Lactated Ringer's (Ringers, Lactated)  1,000 mls @ 150 mls/hr IV ASDIRECTED [facility]


   Last Admin: 21 14:45 Dose:  250 mls/hr


   Documented by: 


Ropivacaine (Naropin 0.2%) Confirm Administered Dose 100 mls @ as directed 

.ROUTE .[facility]-MED ONE


   Stop: 21 06:33


   Last Admin: 21 19:45 Dose:  Not Given


   Documented by: 


Lidocaine HCl (Xylocaine 1%)  50 ml INJECT ONETIME PRN


   PRN Reason: Laceration repair


Methylergonovine Maleate (Methergine)  0.2 mg IM ASDIRECTED PRN


   PRN Reason: Post Partum Hemorrhage


Misoprostol (Cytotec)  200 mcg PO ONETIME PRN


   PRN Reason: Post Partum Hemorrhage


Misoprostol (Cytotec)  25 mcg VAG ONETIME PRN


   PRN Reason: Cervical Ripening


Misoprostol (Cytotec)  25 mcg VAG Q4H PRN


   PRN Reason: Cervical Ripening


Nalbuphine HCl (Nubain)  10 mg IVPUSH Q1H PRN


   PRN Reason: Pain (severe 7-10)


Ropivacaine (Naropin 0.2%) Confirm Administered Dose 20 ml .ROUTE .[facility]-MED ONE


   Stop: 21 11:33


   Last Admin: 21 19:45 Dose:  Not Given


   Documented by: 


Ropivacaine (Naropin 0.2%) Confirm Administered Dose 20 ml .ROUTE .[facility]-MED ONE


   Stop: 21 16:16


   Last Admin: 21 19:45 Dose:  Not Given


   Documented by: 


Sterile Water (Sterile Water For Irrigation)  1,000 ml IRR ASDIRECTED PRN


   PRN Reason: delivery


Terbutaline Sulfate (Brethine)  0.25 mg SUBCUT ASDIRECTED PRN


   PRN Reason: Tacysystole











- Infant Interaction


Support Person: 





- Postpartum Recovery Exam


Fundal Tone: Firm


Fundal Level: 1 Fingerbreadths Below Umbilicus


Fundal Placement: Midline


Lochia Amount: Small


Lochia Color: Rubra/Red


Perineum Description: Intact, Minimal Bruising/Swelling, Other (see below)


Other Perinuem Description: 2nd degree laceration


Episiotomy/Laceration: Approximated


Bladder Status: Voiding





- Exam


General: Alert, Oriented


Lungs: Normal Respiratory Effort


Cardiovascular: Regular Rate, Regular Rhythm


GI/Abdominal Exam: Normal Bowel Sounds, Soft


Extremities: Pedal Edema (trace).  No: Gamal's Sign


Skin: Warm, Dry, Intact


Neurological: No New Focal Deficit


Psy/Mental Status: Alert, Normal Affect, Normal Mood





- Problem List & Annotations


(1) Vaginal delivery


SNOMED Code(s): 975785938


   Code(s): O80 - ENCOUNTER FOR FULL-TERM UNCOMPLICATED DELIVERY   Status: Acute

  Current Visit: Yes   





- Problem List Review


Problem List Initiated/Reviewed/Updated: Yes





- My Orders


Last 24 Hours: 


My Active Orders





21 11:03


Ready for Discharge [RC] PER UNIT ROUTINE 














- Assessment


Assessment:: 





PPD 2 status post 





- Plan


Plan:: 


Patient doing well overall---would like to go home.  Discharge instructions 

reviewed.  Follow up at [facility] 4 weeks.  Discharge to home.

## 2023-01-14 ENCOUNTER — HOSPITAL ENCOUNTER (INPATIENT)
Dept: HOSPITAL 56 - MW.OBCHECK | Age: 28
LOS: 2 days | Discharge: HOME | DRG: 560 | End: 2023-01-16
Attending: OBSTETRICS & GYNECOLOGY | Admitting: OBSTETRICS & GYNECOLOGY
Payer: COMMERCIAL

## 2023-01-14 DIAGNOSIS — Z3A.40: ICD-10-CM

## 2023-01-14 DIAGNOSIS — F41.0: ICD-10-CM

## 2023-01-14 DIAGNOSIS — Z20.822: ICD-10-CM

## 2023-01-14 DIAGNOSIS — Z91.040: ICD-10-CM

## 2023-01-14 PROCEDURE — 00HU33Z INSERTION OF INFUSION DEVICE INTO SPINAL CANAL, PERCUTANEOUS APPROACH: ICD-10-PCS | Performed by: ANESTHESIOLOGY

## 2023-01-14 PROCEDURE — 3E0R3BZ INTRODUCTION OF ANESTHETIC AGENT INTO SPINAL CANAL, PERCUTANEOUS APPROACH: ICD-10-PCS | Performed by: ANESTHESIOLOGY

## 2023-01-14 PROCEDURE — U0002 COVID-19 LAB TEST NON-CDC: HCPCS

## 2023-01-14 PROCEDURE — 10907ZC DRAINAGE OF AMNIOTIC FLUID, THERAPEUTIC FROM PRODUCTS OF CONCEPTION, VIA NATURAL OR ARTIFICIAL OPENING: ICD-10-PCS | Performed by: OBSTETRICS & GYNECOLOGY

## 2023-01-14 RX ADMIN — BUTORPHANOL TARTRATE PRN MG: 1 INJECTION INTRAMUSCULAR; INTRAVENOUS at 20:21

## 2023-01-14 RX ADMIN — BUTORPHANOL TARTRATE PRN MG: 1 INJECTION INTRAMUSCULAR; INTRAVENOUS at 21:51

## 2023-01-15 RX ADMIN — Medication SCH: at 02:08

## 2025-07-23 ENCOUNTER — HOSPITAL ENCOUNTER (INPATIENT)
Dept: HOSPITAL 56 - MW.OB | Age: 30
LOS: 3 days | Discharge: HOME | DRG: 560 | End: 2025-07-26
Attending: OBSTETRICS & GYNECOLOGY | Admitting: OBSTETRICS & GYNECOLOGY
Payer: COMMERCIAL

## 2025-07-23 DIAGNOSIS — O36.5930: Primary | ICD-10-CM

## 2025-07-23 DIAGNOSIS — Z3A.37: ICD-10-CM

## 2025-07-23 LAB
HCT VFR BLD AUTO: 36.9 % (ref 37–47)
HGB BLD-MCNC: 12.9 G/DL (ref 12–16)
IMM GRANULOCYTES # BLD: (no result) K/UL (ref 0–0.05)
IMM GRANULOCYTES NFR BLD: (no result) % (ref 0–0.4)
MCH RBC QN AUTO: 28.4 PG (ref 28–32)
MCHC RBC AUTO-ENTMCNC: 35 G/DL (ref 32–36)
MCHC RBC AUTO-ENTMCNC: 81.1 FL (ref 83–99)
NRBC BLD AUTO-RTO: 0 /100WBC (ref 0–0.2)
NRBC BLD AUTO-RTO: 0 K/UL (ref 0–0.02)
PLATELET # BLD AUTO: 228 K/UL (ref 150–400)
PMV BLD AUTO: 10.8 FL (ref 9.4–12.3)
RBC # BLD AUTO: 4.55 M/UL (ref 4.1–5.3)
WBC # BLD AUTO: 16.77 K/UL (ref 3.9–11.3)

## 2025-07-23 RX ADMIN — MISOPROSTOL PRN MCG: 100 TABLET ORAL at 11:51

## 2025-07-23 RX ADMIN — SODIUM CHLORIDE, SODIUM LACTATE, POTASSIUM CHLORIDE, AND CALCIUM CHLORIDE SCH MLS/HR: .6; .31; .03; .02 INJECTION, SOLUTION INTRAVENOUS at 17:56

## 2025-07-23 RX ADMIN — MISOPROSTOL PRN MCG: 100 TABLET ORAL at 06:22

## 2025-07-23 RX ADMIN — Medication SCH MLS/HR: at 17:58

## 2025-07-23 RX ADMIN — ONDANSETRON PRN MG: 2 INJECTION, SOLUTION INTRAMUSCULAR; INTRAVENOUS at 23:03

## 2025-07-23 RX ADMIN — ROPIVACAINE HYDROCHLORIDE SCH MLS/HR: 2 INJECTION, SOLUTION EPIDURAL; INFILTRATION at 20:36

## 2025-07-24 LAB
PH BLDCOA: 7.24 [PH] (ref 7.18–7.38)
PH BLDCOV: 7.34 [PH] (ref 7.25–7.45)

## 2025-07-24 PROCEDURE — 00HU33Z INSERTION OF INFUSION DEVICE INTO SPINAL CANAL, PERCUTANEOUS APPROACH: ICD-10-PCS | Performed by: OBSTETRICS & GYNECOLOGY

## 2025-07-24 PROCEDURE — 3E0DXGC INTRODUCTION OF OTHER THERAPEUTIC SUBSTANCE INTO MOUTH AND PHARYNX, EXTERNAL APPROACH: ICD-10-PCS | Performed by: OBSTETRICS & GYNECOLOGY

## 2025-07-24 PROCEDURE — 4A1HXCZ MONITORING OF PRODUCTS OF CONCEPTION, CARDIAC RATE, EXTERNAL APPROACH: ICD-10-PCS | Performed by: OBSTETRICS & GYNECOLOGY

## 2025-07-24 PROCEDURE — 3E033VJ INTRODUCTION OF OTHER HORMONE INTO PERIPHERAL VEIN, PERCUTANEOUS APPROACH: ICD-10-PCS | Performed by: OBSTETRICS & GYNECOLOGY

## 2025-07-24 PROCEDURE — 3E0R3BZ INTRODUCTION OF ANESTHETIC AGENT INTO SPINAL CANAL, PERCUTANEOUS APPROACH: ICD-10-PCS | Performed by: OBSTETRICS & GYNECOLOGY

## 2025-07-24 RX ADMIN — VITAMIN A, ASCORBIC ACID, CHOLECALCIFEROL, .ALPHA.-TOCOPHEROL ACETATE, DL-, THIAMINE MONONITRATE, RIBOFLAVIN, NIACINAMIDE, PYRIDOXINE HYDROCHLORIDE, FOLIC ACID, CYANOCOBALAMIN, CALCIUM CARBONATE, IRON, ZINC OXIDE, AND CUPRIC OXIDE SCH EACH: 4000; 120; 400; 22; 1.84; 3; 20; 10; 1; 12; 200; 29; 25; 2 TABLET ORAL at 21:55

## 2025-07-24 RX ADMIN — Medication PRN CAN: at 09:42

## 2025-07-24 RX ADMIN — HYDROXYZINE HYDROCHLORIDE PRN MG: 25 TABLET, FILM COATED ORAL at 02:46

## 2025-07-24 RX ADMIN — ESCITALOPRAM OXALATE SCH MG: 10 TABLET, FILM COATED ORAL at 21:55

## 2025-07-24 RX ADMIN — IBUPROFEN PRN MG: 800 TABLET, FILM COATED ORAL at 14:35

## 2025-07-25 LAB
HCT VFR BLD AUTO: 33.8 % (ref 37–47)
HGB BLD-MCNC: 11.4 G/DL (ref 12–16)

## 2025-07-25 RX ADMIN — CETIRIZINE HYDROCHLORIDE ONE MG: 10 TABLET, FILM COATED ORAL at 12:08
